# Patient Record
Sex: FEMALE | Race: WHITE | Employment: FULL TIME | URBAN - METROPOLITAN AREA
[De-identification: names, ages, dates, MRNs, and addresses within clinical notes are randomized per-mention and may not be internally consistent; named-entity substitution may affect disease eponyms.]

---

## 2021-05-29 ENCOUNTER — RECORDS - HEALTHEAST (OUTPATIENT)
Dept: ADMINISTRATIVE | Facility: CLINIC | Age: 26
End: 2021-05-29

## 2021-08-17 ENCOUNTER — TRANSFERRED RECORDS (OUTPATIENT)
Dept: MULTI SPECIALTY CLINIC | Facility: CLINIC | Age: 26
End: 2021-08-17

## 2021-08-17 LAB — PAP-ABSTRACT: NORMAL

## 2023-08-14 ENCOUNTER — OFFICE VISIT (OUTPATIENT)
Dept: OBGYN | Facility: CLINIC | Age: 28
End: 2023-08-14
Payer: COMMERCIAL

## 2023-08-14 VITALS
BODY MASS INDEX: 27 KG/M2 | HEIGHT: 66 IN | TEMPERATURE: 99.2 F | RESPIRATION RATE: 18 BRPM | HEART RATE: 85 BPM | DIASTOLIC BLOOD PRESSURE: 86 MMHG | WEIGHT: 168 LBS | SYSTOLIC BLOOD PRESSURE: 138 MMHG

## 2023-08-14 DIAGNOSIS — Z30.41 ENCOUNTER FOR SURVEILLANCE OF CONTRACEPTIVE PILLS: Primary | ICD-10-CM

## 2023-08-14 PROCEDURE — 99385 PREV VISIT NEW AGE 18-39: CPT | Performed by: OBSTETRICS & GYNECOLOGY

## 2023-08-14 RX ORDER — ERGOCALCIFEROL (VITAMIN D2) 10 MCG
TABLET ORAL
COMMUNITY

## 2023-08-14 RX ORDER — NORETHINDRONE ACETATE AND ETHINYL ESTRADIOL .03; 1.5 MG/1; MG/1
1 TABLET ORAL DAILY
Qty: 86 TABLET | Refills: 4 | Status: SHIPPED | OUTPATIENT
Start: 2023-08-14

## 2023-08-14 RX ORDER — NORETHINDRONE ACETATE AND ETHINYL ESTRADIOL .03; 1.5 MG/1; MG/1
1 TABLET ORAL
COMMUNITY
Start: 2022-08-15 | End: 2023-08-14

## 2023-08-14 RX ORDER — MULTIVITAMIN WITH IRON
1 TABLET ORAL DAILY
COMMUNITY

## 2023-08-14 NOTE — Clinical Note
Please abstract the following data from this visit with this patient into the appropriate field in Epic:  Tests that can be patient reported without a hard copy:  Pap smear done on this date: 8/17/21 (approximately), by this group: [facility], results were Negative for Intraepithelial Lesion or Malignancy.   Other Tests found in the patient's chart through Chart Review/Care Everywhere:  {Abstract Quality List (Optional):460472}  Note to Abstraction: If this section is blank, no results were found via Chart Review/Care Everywhere.

## 2023-08-14 NOTE — PROGRESS NOTES
SUBJECTIVE:   CC: Anusha Alcala is an 28 year old woman who presents for preventive health visit.   On OCPs; getting  in Oct. Planning on stopping them just so she can assess what her cycles are like. Using condoms and plans to continue until timing is right for pregnancy.    Is a teacher at [employer].     Patient has been advised of split billing requirements and indicates understanding: Yes  Healthy Habits:  Do you get at least three servings of calcium containing foods daily (dairy, green leafy vegetables, etc.)? no, taking calcium and/or vitamin D supplement: yes   Amount of exercise or daily activities, outside of work: 5-6 day(s) per week  Problems taking medications regularly No  Medication side effects: No  Have you had an eye exam in the past two years? yes  Do you see a dentist twice per year? yes  Do you have sleep apnea, excessive snoring or daytime drowsiness?no        Today's PHQ-2 Score:       8/14/2023     8:26 AM   PHQ-2 ( 1999 [employer])   Q1: Little interest or pleasure in doing things 0   Q2: Feeling down, depressed or hopeless 0   PHQ-2 Score 0       Abuse: Current or Past(Physical, Sexual or Emotional)- No  Do you feel safe in your environment? Yes    Have you ever done Advance Care Planning? (For example, a Health Directive, POLST, or a discussion with a medical provider or your loved ones about your wishes): No, advance care planning information given to patient to review.  Patient declined advance care planning discussion at this time.    Social History     Tobacco Use    Smoking status: Never     Passive exposure: Never    Smokeless tobacco: Never   Substance Use Topics    Alcohol use: Yes     Comment: occ.     If you drink alcohol do you typically have >3 drinks per day or >7 drinks per week? No                     Reviewed orders with patient.  Reviewed health maintenance and updated orders accordingly - Yes  Lab work is in process    FHS-7:        No data to display  "             Patient under 40 years of age: Routine Mammogram Screening not recommended.   Pertinent mammograms are reviewed under the imaging tab.    Pertinent mammograms are reviewed under the imaging tab.  History of abnormal Pap smear: NO - age 21-29 PAP every 3 years recommended     Reviewed and updated as needed this visit by clinical staff   Tobacco  Allergies  Meds   Med Hx   Fam Hx  Soc Hx        Reviewed and updated as needed this visit by Provider                 History reviewed. No pertinent past medical history.   Past Surgical History:   Procedure Laterality Date    AS REPAIR CRUCIATE LIGAMENT,KNEE Right      OB History   No obstetric history on file.       ROS:  CONSTITUTIONAL: NEGATIVE for fever, chills, change in weight  INTEGUMENTARU/SKIN: NEGATIVE for worrisome rashes, moles or lesions  EYES: NEGATIVE for vision changes or irritation  ENT: NEGATIVE for ear, mouth and throat problems  RESP: NEGATIVE for significant cough or SOB  BREAST: NEGATIVE for masses, tenderness or discharge  CV: NEGATIVE for chest pain, palpitations or peripheral edema  GI: NEGATIVE for nausea, abdominal pain, heartburn, or change in bowel habits  : NEGATIVE for unusual urinary or vaginal symptoms. Periods are regular.  MUSCULOSKELETAL: NEGATIVE for significant arthralgias or myalgia  NEURO: NEGATIVE for weakness, dizziness or paresthesias  PSYCHIATRIC: NEGATIVE for changes in mood or affect    OBJECTIVE:   /86 (BP Location: Left arm, Patient Position: Chair, Cuff Size: Adult Regular)   Pulse 85   Temp 99.2  F (37.3  C) (Tympanic)   Resp 18   Ht 1.67 m (5' 5.75\")   Wt 76.2 kg (168 lb)   BMI 27.32 kg/m    EXAM:  GENERAL: healthy, alert and no distress  EYES: Eyes grossly normal to inspection  RESP: breathing comfortably on room air with no appreciable cough or wheeze  BREAST: normal without masses, tenderness or nipple discharge and no palpable axillary masses or adenopathy  CV: regular rate, warm and " "well-perfused, normatensive   ABDOMEN: soft, nontender, no hepatosplenomegaly, no masses and bowel sounds normal  MS: no gross musculoskeletal defects noted, no edema  GYN: normal external genitalia, vagina, cervix  SKIN: no suspicious lesions or rashes  NEURO: Normal strength and tone, mentation intact and speech normal  PSYCH: mentation appears normal, affect normal/bright    Diagnostic Test Results:  Labs reviewed in Epic    ASSESSMENT/PLAN:   Anusha was seen today for physical.    Diagnoses and all orders for this visit:    Encounter for surveillance of contraceptive pills  -     norethindrone-ethinyl estradiol (MICROGESTIN 1.5/30) 1.5-30 MG-MCG tablet; Take 1 tablet by mouth daily    #Pre-conception counseling: recommended starting PNVs now, ok to stop OCPs in Oct and track cycles, time intercourse or just have regular sex. Should be pregnant within one year.     Patient has been advised of split billing requirements and indicates understanding: Yes  COUNSELING:   Reviewed preventive health counseling, as reflected in patient instructions  Special attention given to: when next PAP smear is due, flu and covid vaccines int he fall.     Estimated body mass index is 27.32 kg/m  as calculated from the following:    Height as of this encounter: 1.67 m (5' 5.75\").    Weight as of this encounter: 76.2 kg (168 lb).    Weight management plan: Discussed healthy diet and exercise guidelines    She reports that she has never smoked. She has never been exposed to tobacco smoke. She has never used smokeless tobacco.      Counseling Resources:  ATP IV Guidelines  Pooled Cohorts Equation Calculator  Breast Cancer Risk Calculator  BRCA-Related Cancer Risk Assessment: FHS-7 Tool  FRAX Risk Assessment  ICSI Preventive Guidelines  Dietary Guidelines for Americans, 2010  USDA's MyPlate  ASA Prophylaxis  Lung CA Screening    Belle Bowling MD  [facility]    "

## 2023-08-14 NOTE — PROGRESS NOTES
"Please abstract the following data from this visit with this patient into the appropriate field in Epic:    Tests that can be patient reported without a hard copy:    Pap smear done on this date: 8/17/21 (approximately), by this group: [facility], results were Negative for Intraepithelial Lesion or Malignancy.     Other Tests found in the patient's chart through Chart Review/Care Everywhere:        Note to Abstraction: If this section is blank, no results were found via Chart Review/Care Everywhere.      Initial /86 (BP Location: Left arm, Patient Position: Chair, Cuff Size: Adult Regular)   Pulse 85   Temp 99.2  F (37.3  C) (Tympanic)   Resp 18   Ht 1.67 m (5' 5.75\")   Wt 76.2 kg (168 lb)   BMI 27.32 kg/m   Estimated body mass index is 27.32 kg/m  as calculated from the following:    Height as of this encounter: 1.67 m (5' 5.75\").    Weight as of this encounter: 76.2 kg (168 lb). .    "